# Patient Record
Sex: MALE | Race: WHITE
[De-identification: names, ages, dates, MRNs, and addresses within clinical notes are randomized per-mention and may not be internally consistent; named-entity substitution may affect disease eponyms.]

---

## 2017-03-03 ENCOUNTER — HOSPITAL ENCOUNTER (OUTPATIENT)
Dept: HOSPITAL 56 - MW.RT | Age: 57
Discharge: HOME | End: 2017-03-03
Payer: COMMERCIAL

## 2017-03-03 DIAGNOSIS — I48.91: Primary | ICD-10-CM

## 2017-07-20 ENCOUNTER — HOSPITAL ENCOUNTER (EMERGENCY)
Dept: HOSPITAL 56 - MW.ED | Age: 57
Discharge: HOME | End: 2017-07-20
Payer: COMMERCIAL

## 2017-07-20 VITALS — SYSTOLIC BLOOD PRESSURE: 102 MMHG | DIASTOLIC BLOOD PRESSURE: 69 MMHG

## 2017-07-20 DIAGNOSIS — Z79.82: ICD-10-CM

## 2017-07-20 DIAGNOSIS — Z79.899: ICD-10-CM

## 2017-07-20 DIAGNOSIS — E78.00: ICD-10-CM

## 2017-07-20 DIAGNOSIS — R07.81: Primary | ICD-10-CM

## 2017-07-20 DIAGNOSIS — I48.91: ICD-10-CM

## 2017-07-20 DIAGNOSIS — Z98.890: ICD-10-CM

## 2017-07-20 DIAGNOSIS — Z87.442: ICD-10-CM

## 2017-07-20 NOTE — EDM.PDOC
ED HPI GENERAL MEDICAL PROBLEM





- General


Chief Complaint: General


Stated Complaint: RIGHT SIDE PAIN


Time Seen by Provider: 07/20/17 12:50


Source of Information: Reports: Patient


History Limitations: Reports: No Limitations





- History of Present Illness


INITIAL COMMENTS - FREE TEXT/NARRATIVE: 


HISTORY AND PHYSICAL:





History of present illness:


[Patient comes to the emergency room complaining of right lower rib pain. On 

Saturday, July 15 patient was golfing, he had to lay down on his right side to 

reach his ball. His right arm was extended overhead, reaching, while lying on 

his right side. He suspects that he may have been laying on a board or a hard 

surface. He felt a popping sensation and sudden pain to his right lower rib. He 

hasn't felt any improvement in his symptoms and feels that he may be worsening 

today. He has pain to his right lower anterior rib with taking a deep breath, 

but has had no difficulty breathing pr taking a breath or pain in his chest.


Was recently diagnosed with atrial fibrillation.]





Review of systems: 


As per history of present illness and below otherwise all systems reviewed and 

negative.





Past medical history: 


As per history of present illness and as reviewed below otherwise 

noncontributory.





Surgical history: 


As per history of present illness and as reviewed below otherwise 

noncontributory.





Social history: 


No reported history of drug or alcohol abuse.





Family history: 


As per history of present illness and as reviewed below otherwise 

noncontributory.





Physical exam:


HEENT: Atraumatic, normocephalic.


Lungs: Clear to auscultation, breath sounds equal bilaterally. Is tender with 

palpation over right lower anterior rib. No posterior rib tenderness.


Heart: S1S2, regular rate and rhythm.


Abdomen: Soft, nondistended, nontender. Negative for masses guarding or 

rebound. 


Extremities: Ambulatory without difficulty. Neurovascular unremarkable.


Neuro: Awake, alert, oriented.





Diagnostics:


[Right rib and chest x-ray]





Impression: 


[R rib pain]





Plan:


[Patient notified that his rib xray shows no fracture or abnormality. He is 

discharged to home with instructions for OTC analgesia/anti-inflammatories. 

Recommend F/u w/ PCP and return to ER if worsening.]





Definitive disposition and diagnosis as appropriate pending reevaluation and 

review of above.








  ** Right Abdominal


Pain Score (Numeric/FACES): 6





- Related Data


 Allergies











Allergy/AdvReac Type Severity Reaction Status Date / Time


 


No Known Allergies Allergy   Verified 01/07/17 17:18











Home Meds: 


 Home Meds





Cetirizine [ZyrTEC] 1 tab PO DAILY 11/12/15 [History]


Fish Oil/Omega-3 Fatty Acids [Fish Oil 1,000 MG] 1 tab PO DAILY 11/12/15 [

History]


Multivitamin [Multi-Vitamin Daily] 1 tab PO DAILY 11/12/15 [History]


Melatonin 1 tab PO BEDTIME 01/07/17 [History]


Aspirin [Ecotrin] 81 mg DAILY 07/20/17 [History]


Dronedarone HCl [Multaq] 400 mg BID 07/20/17 [History]


atorvaSTATin [Lipitor] 40 mg BEDTIME 07/20/17 [History]











Past Medical History





- Past Health History


Medical/Surgical History: Denies Medical/Surgical History


HEENT History: Reports: None


Cardiovascular History: Reports: Afib, High Cholesterol


Respiratory History: Reports: None


Gastrointestinal History: Reports: None


Other Gastrointestinal History: Dysphagia


Genitourinary History: Reports: Renal Calculus


Other Musculoskeletal History: Some low back pain from time to time, Some 

arthritis, hx Fraxturing a finger


Psychiatric History: Reports: None


Other Endocrine/Metabolic History: Pt reported having thyroid problem in the 

past.


Dermatologic History: Reports: None





- Infectious Disease History


Infectious Disease History: Reports: Chicken Pox





- Past Surgical History


GI Surgical History: Reports: Other (See Below)


Other GI Surgeries/Procedures: Prior EGD and Colonoscopy with Polypectomy


Musculoskeletal Surgical History: Reports: Arthroscopic Knee





Social & Family History





- Family History


Family Medical History: Noncontributory





- Tobacco Use


Smoking Status *Q: Never Smoker


Second Hand Smoke Exposure: No





- Caffeine Use


Caffeine Use: Reports: None





- Recreational Drug Use


Recreational Drug Use: No


Drug Use in Last 12 Months: No





ED ROS GENERAL





- Review of Systems


Review Of Systems: ROS reveals no pertinent complaints other than HPI.





ED EXAM, GENERAL





- Physical Exam


Exam: See Below





Course





- Vital Signs


Last Recorded V/S: 


 Last Vital Signs











Temp  97.3 F   07/20/17 12:32


 


Pulse  60   07/20/17 12:32


 


Resp  18   07/20/17 12:32


 


BP  105/70   07/20/17 12:32


 


Pulse Ox  96   07/20/17 12:32














Departure





- Departure


Time of Disposition: 13:45


Disposition: Home, Self-Care 01


Condition: Good


Clinical Impression: 


 Rib pain on right side








- Discharge Information


Referrals: 


PCP,None [Primary Care Provider] - 


Forms:  ED Department Discharge


Additional Instructions: 


The following information is given to patients seen in the emergency department 

who are being discharged to home. This information is to outline your options 

for follow-up care. We provide all patients seen in our emergency department 

with a follow-up referral.





The need for follow-up, as well as the timing and circumstances, are variable 

depending upon the specifics of your emergency department visit.





If you don't have a primary care physician on staff, we will provide you with a 

referral. We always advise you to contact your personal physician following an 

emergency department visit to inform them of the circumstance of the visit and 

for follow-up with them and/or the need for any referrals to a consulting 

specialist.





The emergency department will also refer you to a specialist when appropriate. 

This referral assures that you have the opportunity for follow-up care with a 

specialist. All of these measure are taken in an effort to provide you with 

optimal care, which includes your follow-up.





Under all circumstances we always encourage you to contact your private 

physician who remains a resource for coordinating your care. When calling for 

follow-up care, please make the office aware that this follow-up is from your 

recent emergency room visit. If for any reason you are refused follow-up, 

please contact the [facility]  emergency 

department at [phone] and asked to speak to the emergency department 

charge nurse.








[facility]


Primary Care


[address] [place] [id number]


Phone: [phone]


Fax: [phone]








Follow-up with your primary care provider or at the clinic listed above in 48-

72 hours.


Return to ER as needed as discussed.

## 2017-07-20 NOTE — CR
EXAMINATION: PA chest and right ribs

 

HISTORY: Pain.

 

FINDINGS: 

The trachea is midline. The cardiomediastinal silhouette is within normal limits. No pulmonary infil
trates, effusions or pneumothorax.

 

Osseous structures appear unremarkable. No displaced rib fracture identified.

 

IMPRESSION: 

No acute cardiopulmonary process.

## 2019-04-29 ENCOUNTER — HOSPITAL ENCOUNTER (OUTPATIENT)
Dept: HOSPITAL 56 - MW.SDS | Age: 59
Discharge: HOME | End: 2019-04-29
Attending: SURGERY
Payer: COMMERCIAL

## 2019-04-29 VITALS — SYSTOLIC BLOOD PRESSURE: 112 MMHG | DIASTOLIC BLOOD PRESSURE: 71 MMHG

## 2019-04-29 DIAGNOSIS — K62.6: ICD-10-CM

## 2019-04-29 DIAGNOSIS — I48.91: ICD-10-CM

## 2019-04-29 DIAGNOSIS — D12.2: ICD-10-CM

## 2019-04-29 DIAGNOSIS — Z79.82: ICD-10-CM

## 2019-04-29 DIAGNOSIS — D12.0: ICD-10-CM

## 2019-04-29 DIAGNOSIS — M18.11: ICD-10-CM

## 2019-04-29 DIAGNOSIS — K62.5: Primary | ICD-10-CM

## 2019-04-29 DIAGNOSIS — Z86.010: ICD-10-CM

## 2019-04-29 DIAGNOSIS — I25.10: ICD-10-CM

## 2019-04-29 DIAGNOSIS — D12.3: ICD-10-CM

## 2019-04-29 DIAGNOSIS — Z79.899: ICD-10-CM

## 2019-04-29 PROCEDURE — 45380 COLONOSCOPY AND BIOPSY: CPT

## 2019-04-29 NOTE — OR
SURGEON:

Lai Alfonso M.D.

 

DATE OF PROCEDURE:  04/29/2019

 

OPERATION PERFORMED:

Colonoscopy with cold cecal ascending colon, distal transverse colon and rectal

polypectomy.

 

ANESTHESIA:

MAC.

 

ASA CLASSIFICATION:

2.

 

PREOPERATIVE DIAGNOSES:

1. Personal history of colon polyps.

2. Intermittent rectal.

 

POSTOPERATIVE DIAGNOSIS:

Cecal ascending colon, distal transverse colon and rectal polyps.

 

DESCRIPTION OF PROCEDURE:

The patient was taken to the endoscopy room and positioned on the endoscopy

table in the left lateral decubitus position.  Time-out was called for

appropriate identification of the patient and procedure.  Monitored anesthesia

care was provided.  The colonoscope was inserted into the rectum and advanced

with minimal difficulty to the cecum where the colonoscope was retroflexed to

visualize the ascending colon from below then straightened and slowly withdrawn.

One polyp was encountered in the cecum and removed with the cold biopsy forceps.

A second polyp was encountered in the ascending colon and again removed with the

cold biopsy forceps.  As the colonoscope was withdrawn, the prep was excellent.

Cecum, ascending colon, hepatic flexure, transverse colon, splenic flexure,

descending colon, sigmoid colon, and rectum were very well visualized.  No

tumors or diverticular changes were noted anywhere in the colon.  A third polyp

was encountered in the distal transverse colon and again removed with the cold

biopsy forceps.  As the colonoscope was withdrawn to the rectum, fourth polyp

was encountered and also removed with the cold biopsy forceps.  Once the

colonoscope was withdrawn to the rectum, it was retroflexed to visualize the

anal orifice from above.  No tumors, polyps, or acute hemorrhoidal changes were

noted.  The colonoscope was then straightened, the rectum aspirated, and the

colonoscope removed.

 

The patient tolerated the procedure well and was taken to recovery room in

stable condition.

 

 

NAJMA / HEATHER

DD:  04/29/2019 09:57:38

DT:  04/29/2019 16:02:15

Job #:  [id number]

## 2019-04-29 NOTE — PCM.OPNOTE
- General Post-Op/Procedure Note


Date of Surgery/Procedure: 04/29/19


Operative Procedure(s): Colonoscopy with cold cecal, ascending colon, distal 

transverse colon and rectal polypectomies


Pre Op Diagnosis: Intermittent rectal bleeding.  Personal history of colon 

polyps.


Post-Op Diagnosis: Cecal, ascending colon, distal transverse colon and rectal 

polyps.


Anesthesia Technique: MAC (ASA II)


Primary Surgeon: Lai Alfonso


Condition: Good


Free Text/Narrative:: 





DICTATION [id number]





CPT CODE 21714

## 2019-04-29 NOTE — PCM.PREANE
Preanesthetic Assessment





- Anesthesia/Transfusion/Family Hx


Anesthesia History: Prior Anesthesia Without Reaction


Other Type of Anesthesia Reaction Comment: Denies any known problem in the past


Family History of Anesthesia Reaction: No


Transfusion History: No Prior Transfusion(s)


Intubation History: Unknown





- Review of Systems


General: No Symptoms


Pulmonary: No Symptoms


Cardiovascular: No Symptoms


Gastrointestinal: No Symptoms, Other (h/o colon polyps in 2013)


Neurological: No Symptoms


Other: Reports: None





- Physical Assessment


O2 Sat by Pulse Oximetry: 98


Respiratory Rate: 16


Vital Signs: 





 Last Vital Signs











Temp  36.6 C   04/29/19 08:06


 


Pulse  82   04/29/19 08:06


 


Resp  16   04/29/19 08:06


 


BP  104/70   04/29/19 08:06


 


Pulse Ox  98   04/29/19 08:06











Height: 1.85 m


Weight: 89.811 kg


ASA Class: 2


Mental Status: Alert & Oriented x3


Airway Class: Mallampati = 2


Dentition: Reports: Normal Dentition


Thyro-Mental Finger Breadths: 3


Mouth Opening Finger Breadths: 2 (very small opening)


ROM/Head Extension: Full


Lungs: Clear to Auscultation, Normal Respiratory Effort


Cardiovascular: Regular Rate, Regular Rhythm





- Allergies


Allergies/Adverse Reactions: 


 Allergies











Allergy/AdvReac Type Severity Reaction Status Date / Time


 


No Known Allergies Allergy   Verified 04/24/19 11:46














- Blood


Blood Available: No





- Anesthesia Plan


Pre-Op Medication Ordered: None





- Acknowledgements


Anesthesia Type Planned: MAC


Pt an Appropriate Candidate for the Planned Anesthesia: Yes


Alternatives and Risks of Anesthesia Discussed w Pt/Guardian: Yes


Pt/Guardian Understands and Agrees with Anesthesia Plan: Yes





PreAnesthesia Questionnaire





- Past Health History


Medical/Surgical History: Denies Medical/Surgical History


HEENT History: Reports: Other (See Below)


Other HEENT History: wears glasses


Cardiovascular History: Reports: Afib (s/p ablation- in regular heart rhytm now 

(2017)), CAD, High Cholesterol


Respiratory History: Reports: None


Gastrointestinal History: Reports: Colon Polyp, Gastritis


Other Gastrointestinal History: Dysphagia


Genitourinary History: Reports: Renal Calculus


Musculoskeletal History: Reports: Fracture


Other Musculoskeletal History: hx of fx thumb


Neurological History: Reports: Other (See Below)


Other Neuro History: hx of motion sickness


Psychiatric History: Reports: None


Other Endocrine/Metabolic History: Pt reported having thyroid problem in the 

past.


Dermatologic History: Reports: None





- Infectious Disease History


Infectious Disease History: Reports: Chicken Pox





- Past Surgical History


Cardiovascular Surgical History: Reports: Cardiac Ablation


Other Cardiovascular Surgeries/Procedures: Cardiac ablation in 2017- no A-Fib 

since


GI Surgical History: Reports: Colonoscopy, EGD


Other GI Surgeries/Procedures: Prior EGD and Colonoscopy with Polypectomy


Male  Surgical History: Reports: Lithotripsy ([facility])


Musculoskeletal Surgical History: Reports: Arthroscopic Knee





- SUBSTANCE USE


Smoking Status *Q: Never Smoker


Recreational Drug Use History: No





- HOME MEDS


Home Medications: 


 Home Meds





Fish Oil/Omega-3 Fatty Acids [Fish Oil 1,000 MG] 1,000 mg PO DAILY 11/12/15 [

History]


Aspirin [Ecotrin] 81 mg PO DAILY 07/20/17 [History]


atorvaSTATin [Lipitor] 40 mg PO BEDTIME 07/20/17 [History]


Cholecalciferol (Vitamin D3) [Vitamin D3] 1,000 unit PO DAILY 04/24/19 [History]











- CURRENT (IN HOUSE) MEDS


Current Meds: 





 Current Medications





Lactated Ringer's (Ringers, Lactated)  1,000 mls @ 125 mls/hr IV ASDIRECTED [facility]


   Last Admin: 04/29/19 08:11 Dose:  125 mls/hr





Discontinued Medications





Lactated Ringer's (Ringers, Lactated)  1,000 mls @ 125 mls/hr IV ASDIRECTED [facility]

## 2019-04-29 NOTE — [id number]
Post Anesthesia Note





- EVALUATION WITHIN 48HRS OF ANESTHETIC


Vital Signs in Normal Range: Yes


Patient Participated in Evaluation: Yes


Respiratory Function Stable: Yes


Airway Patent: Yes


Cardiovascular Function Stable: Yes


Hydration Status Stable: Yes


Pain Control Satisfactory: Yes


Nausea and Vomiting Control Satisfactory: Yes


Mental Status Recovered: Yes


Resp Rate: 10





- COMMENTS/OBSERVATIONS


Free Text/Narrative:: 





no anesthesia problems

## 2019-06-20 ENCOUNTER — HOSPITAL ENCOUNTER (OUTPATIENT)
Dept: HOSPITAL 56 - MW.SDS | Age: 59
Discharge: HOME | End: 2019-06-20
Attending: UROLOGY
Payer: COMMERCIAL

## 2019-06-20 VITALS — SYSTOLIC BLOOD PRESSURE: 111 MMHG | DIASTOLIC BLOOD PRESSURE: 72 MMHG

## 2019-06-20 DIAGNOSIS — M18.11: ICD-10-CM

## 2019-06-20 DIAGNOSIS — E78.00: ICD-10-CM

## 2019-06-20 DIAGNOSIS — I25.10: ICD-10-CM

## 2019-06-20 DIAGNOSIS — Z79.899: ICD-10-CM

## 2019-06-20 DIAGNOSIS — Z79.82: ICD-10-CM

## 2019-06-20 DIAGNOSIS — N20.0: Primary | ICD-10-CM

## 2019-06-20 NOTE — OR
SURGEON:

Lily Rojas M.D.

 

DATE OF PROCEDURE:  06/20/2019

 

PREOPERATIVE DIAGNOSIS:

1 cm and 6 mm right renal pelvis stones and right calyceal stone.

 

POSTOPERATIVE DIAGNOSIS:

1 cm and 6 mm right renal pelvis stones and right calyceal stone.

 

OPERATIONS:

1. Ureteroscopy.

2. Renoscopy.

3. Laser lithotripsy.

4. Stent placement.

 

OPERATING TIME:

2 hours.

 

COMPLICATIONS:

None.

 

DESCRIPTION:

The patient was given general anesthesia.  He was placed in dorsal lithotomy

position and prepped and draped in sterile drapes.  Cystourethroscopy was done,

that was normal.  Two guidewires were advanced in the right ureter, all the way

up into the renal pelvis.  The right lower ureter had to be dilated using the

UroMax II balloon dilator to approximately 15-Samoan to allow the flexible

ureteroscope up into the renal pelvis.  The stones were visualized, and a

combination of dusting and fragmenting was employed.  At the end, a 6-Samoan 26

centimeter double-J stent was placed.  Position was confirmed on fluoroscopy.

The bladder was emptied, and the patient was moved to the recovery room in good

condition.

 

 

YURI / HEATHER

DD:  06/20/2019 11:04:56

DT:  06/20/2019 13:20:32

Job #:  [id number]

## 2019-06-20 NOTE — PCM.PREANE
Preanesthetic Assessment





- Anesthesia/Transfusion/Family Hx


Anesthesia History: Prior Anesthesia Without Reaction


Other Type of Anesthesia Reaction Comment: Denies any known problem in the past


Family History of Anesthesia Reaction: No


Transfusion History: No Prior Transfusion(s)


Intubation History: Unknown





- Review of Systems


General: No Symptoms


Pulmonary: No Symptoms


Cardiovascular: No Symptoms


Gastrointestinal: No Symptoms


Neurological: No Symptoms


Other: Reports: None





- Physical Assessment


NPO Status Date: 06/19/19


O2 Sat by Pulse Oximetry: 95


Respiratory Rate: 14


Vital Signs: 





 Last Vital Signs











Temp  97.5 F   06/20/19 06:55


 


Pulse  86   06/20/19 06:55


 


Resp  14   06/20/19 06:55


 


BP  97/72   06/20/19 06:55


 


Pulse Ox  95   06/20/19 06:55











Height: 6 ft 1 in


Weight: 88.904 kg


ASA Class: 2


Mental Status: Alert & Oriented x3


Airway Class: Mallampati = 2


Dentition: Reports: Normal Dentition


ROM/Head Extension: Full


Lungs: Clear to Auscultation, Normal Respiratory Effort


Cardiovascular: Regular Rate, Regular Rhythm





- Allergies


Allergies/Adverse Reactions: 


 Allergies











Allergy/AdvReac Type Severity Reaction Status Date / Time


 


No Known Allergies Allergy   Verified 06/17/19 09:45














- Blood


Blood Available: No





- Anesthesia Plan


Pre-Op Medication Ordered: None





- Acknowledgements


Anesthesia Type Planned: General Anesthesia


Pt an Appropriate Candidate for the Planned Anesthesia: Yes


Alternatives and Risks of Anesthesia Discussed w Pt/Guardian: Yes


Pt/Guardian Understands and Agrees with Anesthesia Plan: Yes


Additional Comments: 





PMH: s/p ablation for a fib 2 yr ago, non-obstructing CAD by cath prior to 

ablation 2 yr ago- asymptomatic


PLAN: GET with controlled small volume tidal volumes





PreAnesthesia Questionnaire





- Past Health History


Medical/Surgical History: Denies Medical/Surgical History


HEENT History: Reports: None


Other HEENT History: wears glasses


Cardiovascular History: Reports: Afib, High Cholesterol


Respiratory History: Reports: None


Gastrointestinal History: Reports: Colon Polyp, Gastritis


Other Gastrointestinal History: Dysphagia


Genitourinary History: Reports: Renal Calculus


Musculoskeletal History: Reports: Fracture


Other Musculoskeletal History: Some low back pain from time to time, Some 

arthritis, hx Fracturing a finger


Neurological History: Reports: Other (See Below)


Other Neuro History: hx of motion sickness


Psychiatric History: Reports: None


Other Endocrine/Metabolic History: Pt reported having thyroid problem in the 

past.


Dermatologic History: Reports: None





- Infectious Disease History


Infectious Disease History: Reports: Chicken Pox





- Past Surgical History


Cardiovascular Surgical History: Reports: Cardiac Ablation


Other Cardiovascular Surgeries/Procedures: Cardiac ablation in 2017- no A-Fib 

since


GI Surgical History: Reports: Colonoscopy, EGD


Other GI Surgeries/Procedures: Prior EGD and Colonoscopy with Polypectomy


Male  Surgical History: Reports: Lithotripsy ([facility])


Musculoskeletal Surgical History: Reports: Arthroscopic Knee





- SUBSTANCE USE


Smoking Status *Q: Never Smoker


Recreational Drug Use History: No





- HOME MEDS


Home Medications: 


 Home Meds





Fish Oil/Omega-3 Fatty Acids [Fish Oil 1,000 MG] 1,000 mg PO DAILY 11/12/15 [

History]


Aspirin [Ecotrin EC] 81 mg PO DAILY 07/20/17 [History]


atorvaSTATin [Lipitor] 40 mg PO BEDTIME 07/20/17 [History]


Cholecalciferol (Vitamin D3) [Vitamin D3] 1,000 unit PO DAILY 04/24/19 [History]











- CURRENT (IN HOUSE) MEDS


Current Meds: 





 Current Medications





Lactated Ringer's (Ringers, Lactated)  1,000 mls @ 100 mls/hr IV ASDIRECTED [facility]


   Last Admin: 06/20/19 06:55 Dose:  100 mls/hr


Sodium Chloride (Saline Flush)  10 ml FLUSH ASDIRECTED PRN


   PRN Reason: Keep Vein Open


Sodium Chloride (Saline Flush)  2.5 ml FLUSH ASDIRECTED PRN


   PRN Reason: Keep Vein Open


Sodium Chloride (Normal Saline)  10 ml IV ASDIRECTED PRN


   PRN Reason: IV Use





Discontinued Medications





Cefazolin Sodium (Ancef)  1 gm IV ONCALL ONE


   Stop: 06/20/19 00:06

## 2019-06-24 NOTE — CR
EXAMINATION: Abdomen

 

HISTORY: Stent placement

 

COMPARISON: CT dated 4/22/2019

 

TECHNIQUE: 4 fluoroscopic images provided

 

FINDINGS/IMPRESSION: Operative control films demonstrate selection and balloon

dilatation within the right ureter with subsequent stent placement.